# Patient Record
(demographics unavailable — no encounter records)

---

## 2025-01-02 NOTE — ASSESSMENT
[FreeTextEntry1] : HEALTH CARE MAINTENANCE - Influenza vaccine: UTD 2024 - Covid vaccine: UTD  - HIV: UTD - HEP C: UTD - TDAP: UTD - Pap smear: UTD 2024  RTC 1 year or earlier prn

## 2025-01-02 NOTE — HEALTH RISK ASSESSMENT
[Very Good] : ~his/her~  mood as very good [No] : No [0] : 2) Feeling down, depressed, or hopeless: Not at all (0) [PHQ-2 Negative - No further assessment needed] : PHQ-2 Negative - No further assessment needed [Never] : Never [Patient reported PAP Smear was normal] : Patient reported PAP Smear was normal [With Significant Other] : lives with significant other [Employed] : employed [Significant Other] : lives with significant other [Sexually Active] : sexually active [Feels Safe at Home] : Feels safe at home [Fully functional (bathing, dressing, toileting, transferring, walking, feeding)] : Fully functional (bathing, dressing, toileting, transferring, walking, feeding) [Fully functional (using the telephone, shopping, preparing meals, housekeeping, doing laundry, using] : Fully functional and needs no help or supervision to perform IADLs (using the telephone, shopping, preparing meals, housekeeping, doing laundry, using transportation, managing medications and managing finances) [[id number]] : 0 [Reports changes in hearing] : Reports no changes in hearing [Reports changes in vision] : Reports no changes in vision [Reports changes in dental health] : Reports no changes in dental health [PapSmearDate] : 2024 [PapSmearComments] : WNL [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

## 2025-01-02 NOTE — HISTORY OF PRESENT ILLNESS
[FreeTextEntry1] : CPE [de-identified] : This is a 34F with no significant PMHx here for CPE. Feels well. Planning on trying to conceive in the next year. Has had hormonal IUD x6 years.  Of note, she has had history of positive TB on labwork testing in the past but negative CXR and PPD. She does work with international students for speech therapy. No cough, hemoptysis, weight changes or related symptoms.  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 Rare etoh use, no tobacco or drug use.Live with partner.  Sexually active/Last menstrual period: S. Partner. TTC within next year. No menstrual periods while on B/C but has previously had severe cramping/mood swings prior to IUD placement.

## 2025-02-10 NOTE — HISTORY OF PRESENT ILLNESS
[Patient reported PAP Smear was normal] : Patient reported PAP Smear was normal [Y] : Patient is sexually active [N] : Patient denies prior pregnancies [Currently Active] : currently active [Men] : men [No] : No [Yes] : Yes [Patient would like to be screened for STIs] : Patient would like to be screened for STIs [FreeTextEntry1] : Sheila presents for a well women visit and GYN exam. wants to discuss family planning.  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